# Patient Record
Sex: MALE | Race: WHITE | NOT HISPANIC OR LATINO | Employment: OTHER | URBAN - METROPOLITAN AREA
[De-identification: names, ages, dates, MRNs, and addresses within clinical notes are randomized per-mention and may not be internally consistent; named-entity substitution may affect disease eponyms.]

---

## 2017-11-29 ENCOUNTER — OFFICE VISIT (OUTPATIENT)
Dept: INTERNAL MEDICINE | Facility: CLINIC | Age: 62
End: 2017-11-29
Payer: MEDICARE

## 2017-11-29 ENCOUNTER — LAB VISIT (OUTPATIENT)
Dept: LAB | Facility: HOSPITAL | Age: 62
End: 2017-11-29
Attending: FAMILY MEDICINE
Payer: MEDICARE

## 2017-11-29 VITALS
HEIGHT: 65 IN | DIASTOLIC BLOOD PRESSURE: 72 MMHG | OXYGEN SATURATION: 98 % | RESPIRATION RATE: 16 BRPM | WEIGHT: 132.94 LBS | TEMPERATURE: 98 F | SYSTOLIC BLOOD PRESSURE: 124 MMHG | BODY MASS INDEX: 22.15 KG/M2 | HEART RATE: 121 BPM

## 2017-11-29 DIAGNOSIS — Z86.73 HISTORY OF CVA (CEREBROVASCULAR ACCIDENT): ICD-10-CM

## 2017-11-29 DIAGNOSIS — Z28.9 DELAYED IMMUNIZATIONS: ICD-10-CM

## 2017-11-29 DIAGNOSIS — I10 HTN (HYPERTENSION), BENIGN: ICD-10-CM

## 2017-11-29 DIAGNOSIS — Z11.59 ENCOUNTER FOR HEPATITIS C SCREENING TEST FOR LOW RISK PATIENT: ICD-10-CM

## 2017-11-29 DIAGNOSIS — Z72.0 TOBACCO USE: Chronic | ICD-10-CM

## 2017-11-29 DIAGNOSIS — Z12.11 SCREEN FOR COLON CANCER: ICD-10-CM

## 2017-11-29 DIAGNOSIS — R00.0 TACHYCARDIA: ICD-10-CM

## 2017-11-29 DIAGNOSIS — J44.9 CHRONIC OBSTRUCTIVE PULMONARY DISEASE, UNSPECIFIED COPD TYPE: Chronic | ICD-10-CM

## 2017-11-29 DIAGNOSIS — I10 HTN (HYPERTENSION), BENIGN: Primary | ICD-10-CM

## 2017-11-29 LAB
ALBUMIN SERPL BCP-MCNC: 3.9 G/DL
ALP SERPL-CCNC: 136 U/L
ALT SERPL W/O P-5'-P-CCNC: 24 U/L
ANION GAP SERPL CALC-SCNC: 11 MMOL/L
AST SERPL-CCNC: 16 U/L
BASOPHILS # BLD AUTO: 0.04 K/UL
BASOPHILS NFR BLD: 0.4 %
BILIRUB SERPL-MCNC: 0.6 MG/DL
BUN SERPL-MCNC: 14 MG/DL
CALCIUM SERPL-MCNC: 10 MG/DL
CHLORIDE SERPL-SCNC: 107 MMOL/L
CO2 SERPL-SCNC: 25 MMOL/L
CREAT SERPL-MCNC: 1.1 MG/DL
DIFFERENTIAL METHOD: NORMAL
EOSINOPHIL # BLD AUTO: 0.3 K/UL
EOSINOPHIL NFR BLD: 3.1 %
ERYTHROCYTE [DISTWIDTH] IN BLOOD BY AUTOMATED COUNT: 14 %
EST. GFR  (AFRICAN AMERICAN): >60 ML/MIN/1.73 M^2
EST. GFR  (NON AFRICAN AMERICAN): >60 ML/MIN/1.73 M^2
GLUCOSE SERPL-MCNC: 81 MG/DL
HCT VFR BLD AUTO: 45.7 %
HGB BLD-MCNC: 15.1 G/DL
LYMPHOCYTES # BLD AUTO: 1.8 K/UL
LYMPHOCYTES NFR BLD: 19.3 %
MCH RBC QN AUTO: 29 PG
MCHC RBC AUTO-ENTMCNC: 33 G/DL
MCV RBC AUTO: 88 FL
MONOCYTES # BLD AUTO: 0.6 K/UL
MONOCYTES NFR BLD: 6.9 %
NEUTROPHILS # BLD AUTO: 6.4 K/UL
NEUTROPHILS NFR BLD: 70.2 %
PLATELET # BLD AUTO: 234 K/UL
PMV BLD AUTO: 10.1 FL
POTASSIUM SERPL-SCNC: 4.5 MMOL/L
PROT SERPL-MCNC: 8.1 G/DL
RBC # BLD AUTO: 5.21 M/UL
SODIUM SERPL-SCNC: 143 MMOL/L
WBC # BLD AUTO: 9.15 K/UL

## 2017-11-29 PROCEDURE — G0008 ADMIN INFLUENZA VIRUS VAC: HCPCS | Mod: PBBFAC,PO

## 2017-11-29 PROCEDURE — 99205 OFFICE O/P NEW HI 60 MIN: CPT | Mod: S$PBB,,, | Performed by: FAMILY MEDICINE

## 2017-11-29 PROCEDURE — 85025 COMPLETE CBC W/AUTO DIFF WBC: CPT | Mod: PO

## 2017-11-29 PROCEDURE — 93005 ELECTROCARDIOGRAM TRACING: CPT | Mod: PBBFAC,PO | Performed by: FAMILY MEDICINE

## 2017-11-29 PROCEDURE — 36415 COLL VENOUS BLD VENIPUNCTURE: CPT | Mod: PO

## 2017-11-29 PROCEDURE — 80053 COMPREHEN METABOLIC PANEL: CPT | Mod: PO

## 2017-11-29 PROCEDURE — 80061 LIPID PANEL: CPT

## 2017-11-29 PROCEDURE — 86803 HEPATITIS C AB TEST: CPT

## 2017-11-29 PROCEDURE — 99215 OFFICE O/P EST HI 40 MIN: CPT | Mod: PBBFAC,PO,25 | Performed by: FAMILY MEDICINE

## 2017-11-29 PROCEDURE — 99999 PR PBB SHADOW E&M-EST. PATIENT-LVL V: CPT | Mod: PBBFAC,,, | Performed by: FAMILY MEDICINE

## 2017-11-29 PROCEDURE — 93010 ELECTROCARDIOGRAM REPORT: CPT | Mod: ,,, | Performed by: INTERNAL MEDICINE

## 2017-11-29 RX ORDER — ASPIRIN 325 MG
325 TABLET ORAL DAILY
COMMUNITY
Start: 2017-07-28 | End: 2018-07-28

## 2017-11-29 NOTE — PROGRESS NOTES
Subjective:       Patient ID: Td Watkins Jr. is a 62 y.o. male.    Chief Complaint: Establish Care    Patient is here to establish care  CVA 2014, 2015,2016, 2017 - stable, permanent dysarthria, some difficulty with gait he states.   HTN - stable, on no meds at this time.  COPD from PMH - stable, on no medication at this time.  Tobacco abuse - for 0.75 ppd for more than 30 years.  No recent mitigating or exacerbating factors for illnesses.        Review of Systems   Constitutional: Negative for activity change.   HENT: Negative for ear pain.    Eyes: Negative for pain.   Respiratory: Negative for shortness of breath.    Cardiovascular: Negative for chest pain.   Gastrointestinal: Negative for abdominal pain.   Genitourinary: Negative for dysuria.   Musculoskeletal: Negative for neck pain.   Skin: Negative for rash.   Neurological: Negative for headaches.       Objective:      Physical Exam   Constitutional: He appears well-developed and well-nourished. No distress.   HENT:   Head: Normocephalic and atraumatic.   Speech is barely audible   Cardiovascular: Normal rate and regular rhythm.    Pulmonary/Chest: Effort normal and breath sounds normal. No respiratory distress. He has no wheezes.   Abdominal: Soft. Bowel sounds are normal. There is no tenderness. No hernia.   Musculoskeletal: He exhibits no edema.   Neurological: He is alert.   Skin: Skin is warm and dry. No rash noted. He is not diaphoretic. No erythema.   Nursing note and vitals reviewed.      Assessment:       1. HTN (hypertension), benign    2. Chronic obstructive pulmonary disease, unspecified COPD type    3. History of CVA (cerebrovascular accident)    4. Encounter for hepatitis C screening test for low risk patient    5. Delayed immunizations    6. Screen for colon cancer    7. Tobacco use    8. Tachycardia        Plan:           Tachycardia  I have visualized EKG myself  EKG with Normal Sinus Rhythm, normal rate no ST elevation noted.      History  of CVA (cerebrovascular accident)  Given patient's family history of his father's from a stroke as well as his own personal history of having multiple  Cerebrovascular accidents over the past few years, I am concerned for a clotting disorder.  Feel that patient should come in for clotting disorder in  week    HTN (hypertension), benign  -     CBC auto differential; Future; Expected date: 11/29/2017  -     Comprehensive metabolic panel; Future; Expected date: 11/29/2017  -     Lipid panel; Future; Expected date: 11/29/2017    Chronic obstructive pulmonary disease, unspecified COPD type  -     Ambulatory Referral to Pulmonology    History of CVA (cerebrovascular accident)  -     Ambulatory referral to Cardiology  -     Lipid panel; Future; Expected date: 11/29/2017    Encounter for hepatitis C screening test for low risk patient  -     Hepatitis C antibody; Future; Expected date: 11/29/2017    Delayed immunizations  -     Influenza - Quadrivalent (3 years & older) (PF)  -     Pneumococcal Polysaccharide Vaccine (23 Valent) (SQ/IM)    Screen for colon cancer  -     Fecal Immunochemical Test (iFOBT); Future; Expected date: 11/29/2017    Tobacco use  -     US Abdominal Aorta; Future; Expected date: 11/29/2017  -     CT Chest Lung Screening Low Dose; Future; Expected date: 11/29/2017  -     Lipid panel; Future; Expected date: 11/29/2017    Tachycardia  -     IN OFFICE EKG 12-LEAD (to [place])    Other orders  -     zoster vaccine live, PF, (ZOSTAVAX, PF,) 19,400 unit/0.65 mL injection; Inject 19,400 Units into the skin once.  Dispense: 1 vial; Refill: 0  -     diphth,pertus,acell,,tetanus (BOOSTRIX) 2.5-8-5 Lf-mcg-Lf/0.5mL Syrg injection; Inject 0.5 mLs into the muscle once.  Dispense: 0.5 mL; Refill: 0

## 2017-11-29 NOTE — ASSESSMENT & PLAN NOTE
Given patient's family history of his father's from a stroke as well as his own personal history of having multiple  Cerebrovascular accidents over the past few years, I am concerned for a clotting disorder.  Feel that patient should come in for clotting disorder in  week

## 2017-11-30 LAB
CHOLEST SERPL-MCNC: 143 MG/DL
CHOLEST/HDLC SERPL: 3.2 {RATIO}
HCV AB SERPL QL IA: NEGATIVE
HDLC SERPL-MCNC: 45 MG/DL
HDLC SERPL: 31.5 %
LDLC SERPL CALC-MCNC: 85.2 MG/DL
NONHDLC SERPL-MCNC: 98 MG/DL
TRIGL SERPL-MCNC: 64 MG/DL

## 2017-11-30 NOTE — PROGRESS NOTES
Results have been reviewed . All labs are within normal range.   If you have any questions please feel free to contact me.

## 2017-12-01 ENCOUNTER — TELEPHONE (OUTPATIENT)
Dept: RADIOLOGY | Facility: HOSPITAL | Age: 62
End: 2017-12-01

## 2017-12-04 ENCOUNTER — HOSPITAL ENCOUNTER (OUTPATIENT)
Dept: RADIOLOGY | Facility: HOSPITAL | Age: 62
Discharge: HOME | End: 2017-12-04
Attending: FAMILY MEDICINE
Payer: MEDICARE

## 2017-12-04 ENCOUNTER — PATIENT OUTREACH (OUTPATIENT)
Dept: ADMINISTRATIVE | Facility: HOSPITAL | Age: 62
End: 2017-12-04

## 2017-12-04 ENCOUNTER — HOSPITAL ENCOUNTER (OUTPATIENT)
Dept: RADIOLOGY | Facility: HOSPITAL | Age: 62
Discharge: HOME | End: 2017-12-04
Attending: FAMILY MEDICINE

## 2017-12-04 DIAGNOSIS — Z72.0 TOBACCO USE: Chronic | ICD-10-CM

## 2017-12-04 PROCEDURE — 76775 US EXAM ABDO BACK WALL LIM: CPT | Mod: 26,,, | Performed by: RADIOLOGY

## 2017-12-04 PROCEDURE — 76775 US EXAM ABDO BACK WALL LIM: CPT | Mod: TC,PO

## 2017-12-04 PROCEDURE — 76497 UNLISTED CT PROCEDURE: CPT | Mod: TC,PO

## 2017-12-06 ENCOUNTER — PATIENT OUTREACH (OUTPATIENT)
Dept: ADMINISTRATIVE | Facility: HOSPITAL | Age: 62
End: 2017-12-06

## 2017-12-06 NOTE — PROGRESS NOTES
Pt hasn't completed FitKit. Pt was informed once completed the kit can be brought into the clinic lab. Pt voiced understanding

## 2017-12-07 RX ORDER — ATORVASTATIN CALCIUM 80 MG/1
80 TABLET, FILM COATED ORAL DAILY
Qty: 90 TABLET | Refills: 3 | Status: SHIPPED | OUTPATIENT
Start: 2017-12-07

## 2017-12-07 RX ORDER — ONDANSETRON 4 MG/1
8 TABLET, ORALLY DISINTEGRATING ORAL EVERY 6 HOURS PRN
COMMUNITY
End: 2018-02-01 | Stop reason: ALTCHOICE

## 2017-12-07 RX ORDER — ATORVASTATIN CALCIUM 80 MG/1
80 TABLET, FILM COATED ORAL DAILY
COMMUNITY
End: 2017-12-07 | Stop reason: SDUPTHER

## 2017-12-07 RX ORDER — LISINOPRIL 2.5 MG/1
2.5 TABLET ORAL DAILY
COMMUNITY
End: 2017-12-07 | Stop reason: SDUPTHER

## 2017-12-07 RX ORDER — MECLIZINE HYDROCHLORIDE 25 MG/1
25 TABLET ORAL 2 TIMES DAILY PRN
COMMUNITY
End: 2017-12-07

## 2017-12-07 RX ORDER — CITALOPRAM 10 MG/1
10 TABLET ORAL DAILY
Qty: 90 TABLET | Refills: 3 | Status: SHIPPED | OUTPATIENT
Start: 2017-12-07

## 2017-12-07 RX ORDER — THIAMINE HCL 100 MG
100 TABLET ORAL DAILY
COMMUNITY
End: 2017-12-07 | Stop reason: CLARIF

## 2017-12-07 RX ORDER — CITALOPRAM 10 MG/1
10 TABLET ORAL DAILY
COMMUNITY
End: 2017-12-07 | Stop reason: SDUPTHER

## 2017-12-07 RX ORDER — ACETAMINOPHEN 500 MG/1
CAPSULE, LIQUID FILLED ORAL
COMMUNITY

## 2017-12-07 RX ORDER — LISINOPRIL 2.5 MG/1
2.5 TABLET ORAL DAILY
Qty: 90 TABLET | Refills: 3 | Status: SHIPPED | OUTPATIENT
Start: 2017-12-07

## 2017-12-07 NOTE — TELEPHONE ENCOUNTER
Spoke with Pharmacy to see if they could bring the pts meds to him. She said yes that was fine and verified the pts address and number and home health nurse's number for Merary Greene.

## 2017-12-11 DIAGNOSIS — J43.9 PULMONARY EMPHYSEMA, UNSPECIFIED EMPHYSEMA TYPE: Primary | ICD-10-CM

## 2017-12-12 ENCOUNTER — TELEPHONE (OUTPATIENT)
Dept: INTERNAL MEDICINE | Facility: CLINIC | Age: 62
End: 2017-12-12

## 2017-12-12 NOTE — TELEPHONE ENCOUNTER
If diarrhea more than 5x per day or longer than 2 days should be seen in clinic     In light of the pt having tachycardia I would not recommend RX antidiarrheal bc it can worsen this.     Can use imodium OTC if this is an acute problem. Take a directed on package, use as little as possible to reduce risk of constipation. Ensure proper hydration

## 2017-12-12 NOTE — TELEPHONE ENCOUNTER
Reviewed note from home health nurse. Since has been 3 days should come in for appt with myself or Dr Lovell

## 2017-12-13 ENCOUNTER — OFFICE VISIT (OUTPATIENT)
Dept: INTERNAL MEDICINE | Facility: CLINIC | Age: 62
End: 2017-12-13
Payer: MEDICARE

## 2017-12-13 ENCOUNTER — LAB VISIT (OUTPATIENT)
Dept: LAB | Facility: HOSPITAL | Age: 62
End: 2017-12-13
Attending: FAMILY MEDICINE
Payer: MEDICARE

## 2017-12-13 VITALS
DIASTOLIC BLOOD PRESSURE: 68 MMHG | SYSTOLIC BLOOD PRESSURE: 110 MMHG | BODY MASS INDEX: 21.18 KG/M2 | WEIGHT: 131.81 LBS | HEART RATE: 90 BPM | OXYGEN SATURATION: 98 % | HEIGHT: 66 IN | RESPIRATION RATE: 20 BRPM | TEMPERATURE: 97 F

## 2017-12-13 DIAGNOSIS — Z82.3 FAMILY HISTORY OF CVA: ICD-10-CM

## 2017-12-13 DIAGNOSIS — K59.1 FUNCTIONAL DIARRHEA: ICD-10-CM

## 2017-12-13 DIAGNOSIS — Z86.73 HISTORY OF CVA (CEREBROVASCULAR ACCIDENT): Primary | ICD-10-CM

## 2017-12-13 DIAGNOSIS — Z86.73 HISTORY OF CVA (CEREBROVASCULAR ACCIDENT): ICD-10-CM

## 2017-12-13 LAB
ALBUMIN SERPL BCP-MCNC: 3.8 G/DL
ALP SERPL-CCNC: 137 U/L
ALT SERPL W/O P-5'-P-CCNC: 22 U/L
ANION GAP SERPL CALC-SCNC: 11 MMOL/L
AST SERPL-CCNC: 17 U/L
BILIRUB SERPL-MCNC: 0.8 MG/DL
BUN SERPL-MCNC: 12 MG/DL
CALCIUM SERPL-MCNC: 9.7 MG/DL
CHLORIDE SERPL-SCNC: 106 MMOL/L
CO2 SERPL-SCNC: 23 MMOL/L
CREAT SERPL-MCNC: 0.9 MG/DL
EST. GFR  (AFRICAN AMERICAN): >60 ML/MIN/1.73 M^2
EST. GFR  (NON AFRICAN AMERICAN): >60 ML/MIN/1.73 M^2
GLUCOSE SERPL-MCNC: 115 MG/DL
POTASSIUM SERPL-SCNC: 4 MMOL/L
PROT SERPL-MCNC: 7.9 G/DL
SODIUM SERPL-SCNC: 140 MMOL/L

## 2017-12-13 PROCEDURE — 83090 ASSAY OF HOMOCYSTEINE: CPT

## 2017-12-13 PROCEDURE — 86147 CARDIOLIPIN ANTIBODY EA IG: CPT | Mod: 59

## 2017-12-13 PROCEDURE — 86147 CARDIOLIPIN ANTIBODY EA IG: CPT

## 2017-12-13 PROCEDURE — 85305 CLOT INHIBIT PROT S TOTAL: CPT

## 2017-12-13 PROCEDURE — 80053 COMPREHEN METABOLIC PANEL: CPT | Mod: PO

## 2017-12-13 PROCEDURE — 99999 PR PBB SHADOW E&M-EST. PATIENT-LVL IV: CPT | Mod: PBBFAC,,, | Performed by: FAMILY MEDICINE

## 2017-12-13 PROCEDURE — 99214 OFFICE O/P EST MOD 30 MIN: CPT | Mod: PBBFAC,PO | Performed by: FAMILY MEDICINE

## 2017-12-13 PROCEDURE — 81241 F5 GENE: CPT

## 2017-12-13 PROCEDURE — 99214 OFFICE O/P EST MOD 30 MIN: CPT | Mod: S$PBB,,, | Performed by: FAMILY MEDICINE

## 2017-12-13 PROCEDURE — 85303 CLOT INHIBIT PROT C ACTIVITY: CPT

## 2017-12-13 PROCEDURE — 81240 F2 GENE: CPT

## 2017-12-13 PROCEDURE — 36415 COLL VENOUS BLD VENIPUNCTURE: CPT | Mod: PO

## 2017-12-13 PROCEDURE — 86146 BETA-2 GLYCOPROTEIN ANTIBODY: CPT | Mod: 59

## 2017-12-13 NOTE — PROGRESS NOTES
Subjective:       Patient ID: Td Watkins Jr. is a 62 y.o. male.    Chief Complaint: Follow-up and Diarrhea    Diarrhea    This is a recurrent problem. Episode onset: 6 months ago. Episode frequency: occurs 5-6 x/day, patrice 2 days peer week. The problem has been gradually improving. The stool consistency is described as watery. The patient states that diarrhea awakens him from sleep. Pertinent negatives include no abdominal pain, bloating, chills, coughing, fever, headaches or vomiting. Nothing aggravates the symptoms.     Review of Systems   Constitutional: Negative for chills and fever.   Respiratory: Negative for cough and shortness of breath.    Cardiovascular: Negative for chest pain.   Gastrointestinal: Positive for diarrhea. Negative for abdominal pain, bloating and vomiting.   Neurological: Negative for headaches.       Objective:      Physical Exam   Constitutional: He appears well-developed and well-nourished. No distress.   HENT:   Head: Normocephalic and atraumatic.   Right Ear: External ear normal.   Left Ear: External ear normal.   Nose: Nose normal.   Pulmonary/Chest: Effort normal and breath sounds normal. No respiratory distress. He has no wheezes.   Abdominal: Soft. He exhibits no distension. There is no tenderness.   Skin: Skin is warm and dry. No rash noted. He is not diaphoretic. No erythema.   Nursing note and vitals reviewed.      Assessment:       1. History of CVA (cerebrovascular accident)    2. Family history of CVA    3. Functional diarrhea        Plan:           History of CVA (cerebrovascular accident)  Pt has history of strokes annually, with a FH of stroke, and death from stroke.  Hypercoag w/u today.      Family history of CVA  Pt has history of strokes annually, with a FH of stroke, and death from stroke.  Hypercoag w/u today.    Functional diarrhea  Hx of diarrhea, twice weekly over the past 6 months. Start fiber.   Pt has CMP today WNL. No s/s of hypokalemia or  dehydration    History of CVA (cerebrovascular accident)  -     Cardiolipin antibody; Future; Expected date: 12/13/2017  -     Cardiolipin antibody, IgA; Future; Expected date: 12/13/2017  -     Cancel: Antithrombin III; Future; Expected date: 12/13/2017  -     Protein C activity; Future; Expected date: 12/13/2017  -     Protein S activity; Future; Expected date: 12/13/2017  -     DRVVT; Future; Expected date: 12/13/2017  -     Homocysteine, serum; Future; Expected date: 12/13/2017  -     Factor 5 leiden; Future; Expected date: 12/13/2017  -     Prothrombin gene mutation; Future; Expected date: 12/13/2017  -     Beta-2 glycoprotein antibodies; Future; Expected date: 12/13/2017  -     Miscellaneous Sendout Test Blood; Future; Expected date: 12/13/2017  -     Miscellaneous Sendout Test Blood; Future; Expected date: 12/13/2017  -     Miscellaneous Sendout Test Blood; Future; Expected date: 12/13/2017  -     Miscellaneous Sendout Test Blood; Future; Expected date: 12/13/2017  -     ANTITHROMBIN III; Future; Expected date: 12/13/2017    Family history of CVA  -     Cardiolipin antibody; Future; Expected date: 12/13/2017  -     Cardiolipin antibody, IgA; Future; Expected date: 12/13/2017  -     Cancel: Antithrombin III; Future; Expected date: 12/13/2017  -     Protein C activity; Future; Expected date: 12/13/2017  -     Protein S activity; Future; Expected date: 12/13/2017  -     DRVVT; Future; Expected date: 12/13/2017  -     Homocysteine, serum; Future; Expected date: 12/13/2017  -     Factor 5 leiden; Future; Expected date: 12/13/2017  -     Prothrombin gene mutation; Future; Expected date: 12/13/2017  -     Beta-2 glycoprotein antibodies; Future; Expected date: 12/13/2017  -     Miscellaneous Sendout Test Blood; Future; Expected date: 12/13/2017  -     Miscellaneous Sendout Test Blood; Future; Expected date: 12/13/2017  -     Miscellaneous Sendout Test Blood; Future; Expected date: 12/13/2017  -     Miscellaneous  Sendout Test Blood; Future; Expected date: 12/13/2017  -     ANTITHROMBIN III; Future; Expected date: 12/13/2017    Functional diarrhea  -     inulin-sorbitol (FIBER SUPPLEMENT, INULIN,) 2 gram Chew; Take 2 each by mouth once daily.  Dispense: 60 each; Refill: 11  -     Comprehensive metabolic panel; Future; Expected date: 12/13/2017

## 2017-12-13 NOTE — PROGRESS NOTES
Results have been reviewed . All labs are within normal range.   CMP looks fine, please ask him to stay well hydrated  If you have any questions please feel free to contact me.

## 2017-12-14 LAB
APTT PROTEIN C ACTIVATOR+FV DP/APTT PPP: 77 %
HCYS SERPL-SCNC: 13.4 UMOL/L
PROT S ACT/NOR PPP: >130 %

## 2017-12-14 NOTE — ASSESSMENT & PLAN NOTE
Hx of diarrhea, twice weekly over the past 6 months. Start fiber.   Pt has CMP today WNL. No s/s of hypokalemia or dehydration

## 2017-12-14 NOTE — ASSESSMENT & PLAN NOTE
Pt has history of strokes annually, with a FH of stroke, and death from stroke.  Hypercoag w/u today.

## 2017-12-15 LAB
CARDIOLIPIN IGG SER IA-ACNC: <9.4 GPL
CARDIOLIPIN IGM SER IA-ACNC: <9.4 MPL
DEPRECATED CARDIOLIPIN IGA SER: <9 APL

## 2017-12-16 LAB
B2 GLYCOPROT1 IGA SER QL: <9 SAU
B2 GLYCOPROT1 IGG SER QL: <9 SGU
B2 GLYCOPROT1 IGM SER QL: <9 SMU
F2 GENE MUT ANL BLD/T: NORMAL
F5 P.R506Q BLD/T QL: NORMAL

## 2017-12-26 NOTE — PROGRESS NOTES
Results have been reviewed . All labs are within normal range.   If you have any questions please feel free to contact me.  Pending other tests

## 2018-01-16 ENCOUNTER — TELEPHONE (OUTPATIENT)
Dept: INTERNAL MEDICINE | Facility: CLINIC | Age: 63
End: 2018-01-16

## 2018-01-16 NOTE — TELEPHONE ENCOUNTER
[facility] is requesting a verbal order for permission to recertify patient for SN and PT. Is this ok?

## 2018-01-16 NOTE — TELEPHONE ENCOUNTER
Pts Home Health nurse Merary called and said that pt is still having diarrhea about 1 day every week. When it occurs he usually has 5 to 6 loose BM's that day. Does pt need to be sooner than the 3 mo fu appt scheduled for him on 3/13/18 to discuss test results and and come up with a plan to resolve the weekly diarrhea. Or is there something that can be called in for PRN for when the diarrhea occurs?

## 2018-01-16 NOTE — TELEPHONE ENCOUNTER
Spoke with Merary AYALA, with [facility] and informed her per  that was a yes for verbal orders. Merary verbalized understanding.

## 2018-01-18 ENCOUNTER — PATIENT OUTREACH (OUTPATIENT)
Dept: ADMINISTRATIVE | Facility: HOSPITAL | Age: 63
End: 2018-01-18

## 2018-01-23 ENCOUNTER — TELEPHONE (OUTPATIENT)
Dept: INTERNAL MEDICINE | Facility: CLINIC | Age: 63
End: 2018-01-23

## 2018-01-23 NOTE — TELEPHONE ENCOUNTER
Merary with [facility] home [facility] called and said the pt missed his appt last week dur to transportation issues. That she rescheduled it for tomorrow, but he still doesn't have transportation. She is trying to get him set up with [place] [facility] for transportation but it wont be processed this week. That she will call us back to reschedule it later.

## 2018-02-01 ENCOUNTER — OFFICE VISIT (OUTPATIENT)
Dept: INTERNAL MEDICINE | Facility: CLINIC | Age: 63
End: 2018-02-01
Payer: MEDICARE

## 2018-02-01 VITALS
BODY MASS INDEX: 21.08 KG/M2 | TEMPERATURE: 97 F | HEART RATE: 103 BPM | SYSTOLIC BLOOD PRESSURE: 108 MMHG | DIASTOLIC BLOOD PRESSURE: 58 MMHG | WEIGHT: 131.19 LBS | RESPIRATION RATE: 12 BRPM | HEIGHT: 66 IN | OXYGEN SATURATION: 99 %

## 2018-02-01 DIAGNOSIS — K59.1 FUNCTIONAL DIARRHEA: ICD-10-CM

## 2018-02-01 DIAGNOSIS — E86.0 DEHYDRATION: ICD-10-CM

## 2018-02-01 PROCEDURE — 99999 PR PBB SHADOW E&M-EST. PATIENT-LVL IV: CPT | Mod: PBBFAC,,, | Performed by: NURSE PRACTITIONER

## 2018-02-01 PROCEDURE — 99214 OFFICE O/P EST MOD 30 MIN: CPT | Mod: PBBFAC,PO | Performed by: NURSE PRACTITIONER

## 2018-02-01 PROCEDURE — 99214 OFFICE O/P EST MOD 30 MIN: CPT | Mod: S$PBB,,, | Performed by: NURSE PRACTITIONER

## 2018-02-01 NOTE — PROGRESS NOTES
Subjective:       Patient ID: Td Watkins Jr. is a 62 y.o. male.    Chief Complaint: Diarrhea    Pt admits to some lightheadedness. He states that he does only drink about one can of coke a day and no other liquids. He also reports that he does not eat much through the day, specifically he has a low intake of protein and only has one serving at night.      Diarrhea    This is a chronic problem. The current episode started more than 1 month ago. The problem occurs 5 to 10 times per day. The problem has been resolved. Pertinent negatives include no abdominal pain, arthralgias, bloating, chills, coughing, fever, headaches, increased  flatus, myalgias, sweats, URI, vomiting or weight loss. Treatments tried: fiber supplement. The treatment provided significant relief.     Review of Systems   Constitutional: Negative for appetite change, chills, diaphoresis, fatigue, fever and weight loss.   HENT: Negative.    Eyes: Negative.    Respiratory: Negative for cough, chest tightness and wheezing.    Cardiovascular: Negative.    Gastrointestinal: Positive for diarrhea. Negative for abdominal pain, bloating, flatus and vomiting.   Musculoskeletal: Negative for arthralgias and myalgias.   Skin: Negative.    Neurological: Positive for weakness (chronic, no changes) and light-headedness. Negative for dizziness and headaches.       Objective:      Physical Exam   Constitutional: He is oriented to person, place, and time. He appears well-developed and well-nourished. No distress.   HENT:   Head: Normocephalic and atraumatic.   Right Ear: External ear normal.   Left Ear: External ear normal.   Nose: Nose normal.   Eyes: Conjunctivae and EOM are normal. Pupils are equal, round, and reactive to light.   Cardiovascular: Normal rate, regular rhythm, normal heart sounds and intact distal pulses.    Pulmonary/Chest: Effort normal and breath sounds normal. No respiratory distress. He has no wheezes.   Abdominal: Soft. He exhibits no  distension. There is no tenderness.   Neurological: He is alert and oriented to person, place, and time. He exhibits normal muscle tone. Coordination normal. GCS eye subscore is 4. GCS verbal subscore is 5. GCS motor subscore is 6.   Skin: Skin is warm and dry. No rash noted. He is not diaphoretic. No erythema.   Psychiatric: He has a normal mood and affect. His behavior is normal. Judgment and thought content normal. His speech is delayed.   Nursing note and vitals reviewed.      Assessment:       1. Functional diarrhea    2. Dehydration        Plan:     Problem List Items Addressed This Visit        Renal/    Dehydration    Current Assessment & Plan     Increase water to at least 64 ounces daily            GI    Functional diarrhea    Current Assessment & Plan     Greatly improved with use of fiber, continue this            keep F/U with Dr Lovell as scheduled      After pt was gone was given note from  PT stating pt was having coordination issues. Did not have any complaints of this during his visit and he seemed coordinated and well during his visit.

## 2018-02-01 NOTE — PATIENT INSTRUCTIONS
Dehydration (Adult)  Dehydration occurs when your body loses too much fluid. This may be the result of prolonged vomiting or diarrhea, excessive sweating, or a high fever. It may also happen if you dont drink enough fluid when youre sick or out in the heat. Misuse of diuretics (water pills) can also be a cause.  Symptoms include thirst and decreased urine output. You may also feel dizzy, weak, fatigued, or very drowsy. The diet described below is usually enough to treat dehydration. In some cases, you may need medicine.  Home care  · Drink at least 12 8-ounce glasses of fluid every day to resolve the dehydration. Fluid may include water; orange juice; lemonade; apple, grape, or cranberry juice; clear fruit drinks; electrolyte replacement and sports drinks; and teas and coffee without caffeine. If you have been diagnosed with a kidney disease, ask your doctor how much and what types of fluids you should drink to prevent dehydration. If you have kidney disease, fluid can build up in the body. This can be dangerous to your health.  · If you have a fever, muscle aches, or a headache as a result of a cold or flu, you may take acetaminophen or ibuprofen, unless another medicine was prescribed. If you have chronic liver or kidney disease, or have ever had a stomach ulcer or gastrointestinal bleeding, talk with your health care provider before using these medicines. Don't take aspirin if you are younger than 18 and have a fever. Aspirin raises the chance for severe liver injury.  Follow-up care  Follow up with your health care provider, or as advised.  When to seek medical advice  Call your health care provider right away if any of these occur:  · Continued vomiting  · Frequent diarrhea (more than 5 times a day); blood (red or black color) or mucus in diarrhea  · Blood in vomit or stool  · Swollen abdomen or increasing abdominal pain  · Weakness, dizziness, or fainting  · Unusual drowsiness or confusion  · Reduced urine  output or extreme thirst  · Fever of 100.4°F (34°C) or higher  Date Last Reviewed: 5/31/2015  © 5496-8903 [employer]. [address] [place] [address], [place] [address]. All rights reserved. This information is not intended as a substitute for professional medical care. Always follow your healthcare professional's instructions.        Nutrition and MyPlate: Protein Foods    This group includes foods that are high in protein. Protein helps the body build new cells and keeps tissues healthy. Most Americans get enough protein without even trying. It can be harder for vegetarians, but plenty of non-meat foods are rich in protein, too. Its best to get protein from a variety of sources.  Nutrient-rich choices  There's a lot more to this food group than just meat and beans. It also includes nuts, seeds, and eggs. There are all sorts of nutrient-rich choices:  · Chicken and turkey with the skin removed  · Fish and shellfish  · Lean beef, pork, or lamb (without visible fat)  · Soy products, such as tofu, soybeans (edamame), tempeh, or soymilk  · Black beans, kidney beans, fitzgerald beans, chickpeas (garbanzo beans), and lentils (Note: beans and peas count as both a protein and a vegetable)  · Peanuts, almonds, walnuts, sesame seeds, and sunflower seeds, as well as foods made from these (such as peanut butter or tahini)  · Eggs and foods made with eggs (such as quiche or frittata)  What makes meat and beans less healthy?  · Fatty meat is not healthy. Before you cook meat, trim off all the fat you can see. Chicken and turkey skin is also high in fat, and should be removed before cooking.  · Breading and frying make food less healthy. This includes dishes like fried chicken, fried fish, and refried beans.  · Sausage and lunch meats tend to be high in fat and salt. Buy low-fat, low-sodium versions.  One small change  Make a meal that includes a non-meat source of protein (such as tofu, lentils, or any other food listed  above). Have a better idea? Write it here:  _____________________________________________________________  Date Last Reviewed: 6/25/2015  © 7018-0099 [employer]. [address] [place] [address], [place] [address]. All rights reserved. This information is not intended as a substitute for professional medical care. Always follow your healthcare professional's instructions.      INCREASE WATER AND PROTEIN INTAKE

## 2018-02-09 ENCOUNTER — HOSPITAL ENCOUNTER (EMERGENCY)
Facility: HOSPITAL | Age: 63
Discharge: HOME | End: 2018-02-09
Attending: EMERGENCY MEDICINE
Payer: MEDICARE

## 2018-02-09 VITALS
SYSTOLIC BLOOD PRESSURE: 125 MMHG | TEMPERATURE: 98 F | OXYGEN SATURATION: 96 % | HEART RATE: 66 BPM | BODY MASS INDEX: 21.08 KG/M2 | RESPIRATION RATE: 16 BRPM | HEIGHT: 66 IN | DIASTOLIC BLOOD PRESSURE: 61 MMHG | WEIGHT: 131.19 LBS

## 2018-02-09 DIAGNOSIS — N20.0 KIDNEY STONE ON LEFT SIDE: Primary | ICD-10-CM

## 2018-02-09 DIAGNOSIS — R10.32 LEFT LOWER QUADRANT PAIN: ICD-10-CM

## 2018-02-09 LAB
ALBUMIN SERPL BCP-MCNC: 3.9 G/DL
ALP SERPL-CCNC: 110 U/L
ALT SERPL W/O P-5'-P-CCNC: 18 U/L
ANION GAP SERPL CALC-SCNC: 11 MMOL/L
AST SERPL-CCNC: 15 U/L
BACTERIA #/AREA URNS AUTO: ABNORMAL /HPF
BASOPHILS # BLD AUTO: 0.02 K/UL
BASOPHILS NFR BLD: 0.2 %
BILIRUB SERPL-MCNC: 0.5 MG/DL
BILIRUB UR QL STRIP: NEGATIVE
BUN SERPL-MCNC: 13 MG/DL
CALCIUM SERPL-MCNC: 9.4 MG/DL
CHLORIDE SERPL-SCNC: 109 MMOL/L
CLARITY UR REFRACT.AUTO: CLEAR
CO2 SERPL-SCNC: 23 MMOL/L
COLOR UR AUTO: YELLOW
CREAT SERPL-MCNC: 1.2 MG/DL
DIFFERENTIAL METHOD: ABNORMAL
EOSINOPHIL # BLD AUTO: 0.1 K/UL
EOSINOPHIL NFR BLD: 0.6 %
ERYTHROCYTE [DISTWIDTH] IN BLOOD BY AUTOMATED COUNT: 13.7 %
EST. GFR  (AFRICAN AMERICAN): >60 ML/MIN/1.73 M^2
EST. GFR  (NON AFRICAN AMERICAN): >60 ML/MIN/1.73 M^2
GLUCOSE SERPL-MCNC: 140 MG/DL
GLUCOSE UR QL STRIP: NEGATIVE
HCT VFR BLD AUTO: 43.8 %
HGB BLD-MCNC: 14.6 G/DL
HGB UR QL STRIP: ABNORMAL
KETONES UR QL STRIP: NEGATIVE
LEUKOCYTE ESTERASE UR QL STRIP: NEGATIVE
LIPASE SERPL-CCNC: 43 U/L
LYMPHOCYTES # BLD AUTO: 1.1 K/UL
LYMPHOCYTES NFR BLD: 8.7 %
MCH RBC QN AUTO: 29.2 PG
MCHC RBC AUTO-ENTMCNC: 33.3 G/DL
MCV RBC AUTO: 88 FL
MICROSCOPIC COMMENT: ABNORMAL
MONOCYTES # BLD AUTO: 0.4 K/UL
MONOCYTES NFR BLD: 3 %
NEUTROPHILS # BLD AUTO: 10.8 K/UL
NEUTROPHILS NFR BLD: 87.3 %
NITRITE UR QL STRIP: NEGATIVE
PH UR STRIP: 6 [PH] (ref 5–8)
PLATELET # BLD AUTO: 225 K/UL
PMV BLD AUTO: 10.2 FL
POTASSIUM SERPL-SCNC: 3.9 MMOL/L
PROT SERPL-MCNC: 7 G/DL
PROT UR QL STRIP: ABNORMAL
RBC # BLD AUTO: 5 M/UL
RBC #/AREA URNS AUTO: >100 /HPF (ref 0–4)
SODIUM SERPL-SCNC: 143 MMOL/L
SP GR UR STRIP: 1.02 (ref 1–1.03)
URN SPEC COLLECT METH UR: ABNORMAL
UROBILINOGEN UR STRIP-ACNC: NEGATIVE EU/DL
WBC # BLD AUTO: 12.41 K/UL
WBC #/AREA URNS AUTO: 2 /HPF (ref 0–5)
YEAST UR QL AUTO: ABNORMAL

## 2018-02-09 PROCEDURE — 96361 HYDRATE IV INFUSION ADD-ON: CPT

## 2018-02-09 PROCEDURE — 25000003 PHARM REV CODE 250: Performed by: EMERGENCY MEDICINE

## 2018-02-09 PROCEDURE — 83690 ASSAY OF LIPASE: CPT

## 2018-02-09 PROCEDURE — 63600175 PHARM REV CODE 636 W HCPCS: Performed by: EMERGENCY MEDICINE

## 2018-02-09 PROCEDURE — 80053 COMPREHEN METABOLIC PANEL: CPT

## 2018-02-09 PROCEDURE — 96374 THER/PROPH/DIAG INJ IV PUSH: CPT

## 2018-02-09 PROCEDURE — 85025 COMPLETE CBC W/AUTO DIFF WBC: CPT

## 2018-02-09 PROCEDURE — 99285 EMERGENCY DEPT VISIT HI MDM: CPT | Mod: 25

## 2018-02-09 PROCEDURE — 81000 URINALYSIS NONAUTO W/SCOPE: CPT

## 2018-02-09 RX ORDER — HYDROCODONE BITARTRATE AND ACETAMINOPHEN 5; 325 MG/1; MG/1
1 TABLET ORAL
Status: COMPLETED | OUTPATIENT
Start: 2018-02-09 | End: 2018-02-09

## 2018-02-09 RX ORDER — KETOROLAC TROMETHAMINE 30 MG/ML
15 INJECTION, SOLUTION INTRAMUSCULAR; INTRAVENOUS
Status: COMPLETED | OUTPATIENT
Start: 2018-02-09 | End: 2018-02-09

## 2018-02-09 RX ORDER — TAMSULOSIN HYDROCHLORIDE 0.4 MG/1
0.4 CAPSULE ORAL DAILY
Qty: 30 CAPSULE | Refills: 0 | Status: SHIPPED | OUTPATIENT
Start: 2018-02-09 | End: 2019-02-09

## 2018-02-09 RX ORDER — ONDANSETRON 4 MG/1
8 TABLET, FILM COATED ORAL 2 TIMES DAILY
COMMUNITY

## 2018-02-09 RX ORDER — HYDROCODONE BITARTRATE AND ACETAMINOPHEN 7.5; 325 MG/1; MG/1
1 TABLET ORAL EVERY 6 HOURS PRN
Qty: 12 TABLET | Refills: 0 | Status: SHIPPED | OUTPATIENT
Start: 2018-02-09

## 2018-02-09 RX ORDER — KETOROLAC TROMETHAMINE 10 MG/1
10 TABLET, FILM COATED ORAL EVERY 6 HOURS
Qty: 15 TABLET | Refills: 0 | Status: SHIPPED | OUTPATIENT
Start: 2018-02-09

## 2018-02-09 RX ORDER — THIAMINE HCL 250 MG
250 TABLET ORAL DAILY
COMMUNITY

## 2018-02-09 RX ADMIN — HYDROCODONE BITARTRATE AND ACETAMINOPHEN 1 TABLET: 5; 325 TABLET ORAL at 02:02

## 2018-02-09 RX ADMIN — KETOROLAC TROMETHAMINE 15 MG: 30 INJECTION, SOLUTION INTRAMUSCULAR at 02:02

## 2018-02-09 RX ADMIN — SODIUM CHLORIDE 1000 ML: 0.9 INJECTION, SOLUTION INTRAVENOUS at 01:02

## 2018-02-09 NOTE — ED NOTES
Patient reports feeling better. Skin warm and dry resp even and unlabored. Patient awaiting disposition. Will continue to monitor.

## 2018-02-09 NOTE — ED NOTES
Calls placed to patients sister and daughter on his contact list and was unsuccessful in reaching either party. Patient reports to call [facility] but due to a stroke he does not know his number.

## 2018-02-09 NOTE — ED PROVIDER NOTES
Encounter Date: 2/9/2018       History     Chief Complaint   Patient presents with    Abdominal Pain     Patient reports LLQ pain tender to palaption     The history is provided by the patient and the EMS personnel.   Abdominal Pain   The current episode started 1 to 2 hours ago. The onset of the illness was abrupt. The problem has been gradually improving. The abdominal pain is located in the LLQ. The abdominal pain does not radiate. The severity of the abdominal pain is 7/10. The abdominal pain is relieved by nothing. The other symptoms of the illness do not include fever, jaundice, melena, shortness of breath, nausea, vomiting, diarrhea, dysuria, hematemesis or hematochezia.   The patient has not had a change in bowel habit (Normal bowel movement this morning). Symptoms associated with the illness do not include chills, anorexia, diaphoresis, heartburn, constipation, urgency, hematuria, frequency or back pain.     Review of patient's allergies indicates:  No Known Allergies  Past Medical History:   Diagnosis Date    COPD (chronic obstructive pulmonary disease)     CVA (cerebral infarction)     HLD (hyperlipidemia)     HTN (hypertension), benign     Stroke      History reviewed. No pertinent surgical history.  Family History   Problem Relation Age of Onset    Stroke Father      Social History   Substance Use Topics    Smoking status: Current Every Day Smoker     Packs/day: 1.00     Types: Cigarettes    Smokeless tobacco: Never Used    Alcohol use No     Review of Systems   Constitutional: Negative for chills, diaphoresis and fever.   HENT: Negative for sore throat.    Respiratory: Negative for shortness of breath.    Cardiovascular: Negative for chest pain.   Gastrointestinal: Positive for abdominal pain. Negative for anorexia, constipation, diarrhea, heartburn, hematemesis, hematochezia, jaundice, melena, nausea and vomiting.   Genitourinary: Negative for difficulty urinating, dysuria, flank pain,  frequency, hematuria and urgency.   Musculoskeletal: Negative for back pain.   Skin: Negative for rash.   Neurological: Negative for weakness.   Hematological: Does not bruise/bleed easily.   All other systems reviewed and are negative.      Physical Exam     Initial Vitals [02/09/18 1327]   BP Pulse Resp Temp SpO2   128/66 74 18 97.8 °F (36.6 °C) 98 %      MAP       86.67         Physical Exam    Nursing note and vitals reviewed.  Constitutional: He appears well-developed and well-nourished.   HENT:   Head: Normocephalic and atraumatic.   Mouth/Throat: Oropharynx is clear and moist.   Eyes: EOM are normal. Pupils are equal, round, and reactive to light.   Neck: Normal range of motion. Neck supple.   Cardiovascular: Normal rate, regular rhythm, normal heart sounds and intact distal pulses.   Pulmonary/Chest: Breath sounds normal. No respiratory distress. He has no wheezes. He has no rhonchi.   Abdominal: Soft. Bowel sounds are normal. He exhibits no distension, no ascites and no mass. There is tenderness in the left lower quadrant. There is guarding. There is no rigidity, no rebound, no tenderness at McBurney's point and negative Snow's sign. No hernia.   Musculoskeletal: Normal range of motion. He exhibits no edema.   Neurological: He is alert and oriented to person, place, and time. He has normal strength. No cranial nerve deficit or sensory deficit.   Skin: Skin is warm and dry. No rash noted.   Psychiatric: He has a normal mood and affect. His behavior is normal. Judgment and thought content normal.         ED Course   Procedures  Labs Reviewed   CBC W/ AUTO DIFFERENTIAL - Abnormal; Notable for the following:        Result Value    Gran # (ANC) 10.8 (*)     Gran% 87.3 (*)     Lymph% 8.7 (*)     Mono% 3.0 (*)     All other components within normal limits   COMPREHENSIVE METABOLIC PANEL - Abnormal; Notable for the following:     Glucose 140 (*)     All other components within normal limits   URINALYSIS -  Abnormal; Notable for the following:     Protein, UA Trace (*)     Occult Blood UA 3+ (*)     All other components within normal limits   URINALYSIS MICROSCOPIC - Abnormal; Notable for the following:     RBC, UA >100 (*)     Bacteria, UA Few (*)     Yeast, UA Rare (*)     All other components within normal limits   LIPASE        Results for orders placed or performed during the hospital encounter of 02/09/18   CBC W/ AUTO DIFFERENTIAL   Result Value Ref Range    WBC 12.41 3.90 - 12.70 K/uL    RBC 5.00 4.60 - 6.20 M/uL    Hemoglobin 14.6 14.0 - 18.0 g/dL    Hematocrit 43.8 40.0 - 54.0 %    MCV 88 82 - 98 fL    MCH 29.2 27.0 - 31.0 pg    MCHC 33.3 32.0 - 36.0 g/dL    RDW 13.7 11.5 - 14.5 %    Platelets 225 150 - 350 K/uL    MPV 10.2 9.2 - 12.9 fL    Gran # (ANC) 10.8 (H) 1.8 - 7.7 K/uL    Lymph # 1.1 1.0 - 4.8 K/uL    Mono # 0.4 0.3 - 1.0 K/uL    Eos # 0.1 0.0 - 0.5 K/uL    Baso # 0.02 0.00 - 0.20 K/uL    Gran% 87.3 (H) 38.0 - 73.0 %    Lymph% 8.7 (L) 18.0 - 48.0 %    Mono% 3.0 (L) 4.0 - 15.0 %    Eosinophil% 0.6 0.0 - 8.0 %    Basophil% 0.2 0.0 - 1.9 %    Differential Method Automated    Comp. Metabolic Panel   Result Value Ref Range    Sodium 143 136 - 145 mmol/L    Potassium 3.9 3.5 - 5.1 mmol/L    Chloride 109 95 - 110 mmol/L    CO2 23 23 - 29 mmol/L    Glucose 140 (H) 70 - 110 mg/dL    BUN, Bld 13 8 - 23 mg/dL    Creatinine 1.2 0.5 - 1.4 mg/dL    Calcium 9.4 8.7 - 10.5 mg/dL    Total Protein 7.0 6.0 - 8.4 g/dL    Albumin 3.9 3.5 - 5.2 g/dL    Total Bilirubin 0.5 0.1 - 1.0 mg/dL    Alkaline Phosphatase 110 55 - 135 U/L    AST 15 10 - 40 U/L    ALT 18 10 - 44 U/L    Anion Gap 11 8 - 16 mmol/L    eGFR if African American >60.0 >60 mL/min/1.73 m^2    eGFR if non African American >60.0 >60 mL/min/1.73 m^2   Lipase   Result Value Ref Range    Lipase 43 4 - 60 U/L   Urinalysis - Clean Catch   Result Value Ref Range    Specimen UA Urine, Clean Catch     Color, UA Yellow Yellow, Straw, Arianne    Appearance, UA Clear Clear     pH, UA 6.0 5.0 - 8.0    Specific Gravity, UA 1.025 1.005 - 1.030    Protein, UA Trace (A) Negative    Glucose, UA Negative Negative    Ketones, UA Negative Negative    Bilirubin (UA) Negative Negative    Occult Blood UA 3+ (A) Negative    Nitrite, UA Negative Negative    Urobilinogen, UA Negative <2.0 EU/dL    Leukocytes, UA Negative Negative   Urinalysis Microscopic   Result Value Ref Range    RBC, UA >100 (H) 0 - 4 /hpf    WBC, UA 2 0 - 5 /hpf    Bacteria, UA Few (A) None-Occ /hpf    Yeast, UA Rare (A) None    Microscopic Comment SEE COMMENT                 Imaging Results          CT Renal Stone Study ABD Pelvis WO (Final result)  Result time 02/09/18 14:11:02    Final result by Alvarado Dixon MD (02/09/18 14:11:02)                 Impression:       Moderate left-sided hydronephrosis down to the UVJ. 3 mm stone at the left UVJ.     Diffuse wall thickening which suggests chronic outlet obstruction. Prostate is enlarged measuring 5.4 cm    All CT scans at this facility use dose modulation, iterative reconstruction and/or weight based dosing when appropriate to reduce radiation dose to as low as reasonably achievable.      Electronically signed by: ALVARADO DIXON MD  Date:     02/09/18  Time:    14:11              Narrative:    Indication: Abdominal pain.    Routine noncontrast CT images of the abdomen and pelvis were obtained.    Findings: Evaluation of solid organ or vascular pathology is limited due to lack of IV contrast.    The lung bases are well aerated.  Heart size is normal.  No pericardial or pleural effusion. The emphysematous changes are seen at the lung bases.    Moderate hiatal hernia is noted. Moderate amount of ingested material is seen within the stomach. Fluid level is seen within the distal esophagus likely reflecting reflux.    The liver is normal in size and attenuation on this noncontrast exam.  1.2 cm hypodensity is seen within the right hepatic lobe which is too small to characterize but  likely reflects a small cyst. Additional subcentimeter hypodensity is seen within segment 7. Layering gallstones are seen within the gallbladder which is nondistended. The  spleen, pancreas, adrenal glands are normal.  Aorta demonstrates moderate atherosclerotic disease.      The kidneys are normal in size shape and position without radiopaque calculus.Bilateral nonspecific perinephric stranding is seen. Moderate left-sided hydronephrosis down to the UVJ. 3 mm stone at the left UVJ. Diffuse wall thickening which suggests chronic outlet obstruction. Prostate is enlarged measuring 5.4 cm    The visualized loops of small bowel are unremarkable.The appendix is visualized in the right lower quadrant.  There is no inflammation. Colon demonstrates diverticulosis without evidence of diverticulitis..      Bones appear intact .                                         ED Course     patient improved after treatment here in the emergency Department tolerating by mouth well and eager for discharge home.  His abdomen is still minimally tender on the left lower quadrant but no rebound or guarding.  He will follow-up with his primary care doctor for recheck in the next 2-3 days or return to emergency department for any worsening signs or symptoms.  He will also follow-up with urologist as discussed and was given information to see Dr. Chelsy JC.    I discussed with patient and/or family/caretaker that evaluation in the ED does not suggest any emergent or life threatening medical conditions requiring immediate intervention beyond what was provided in the ED, and I believe patient is safe for discharge.  Regardless, an unremarkable evaluation in the ED does not preclude the development or presence of a serious of life threatening condition. As such, patient was instructed to return immediately for any worsening or change in current symptoms.  Clinical Impression:   The primary encounter diagnosis was Kidney stone on left side. A  diagnosis of Left lower quadrant pain was also pertinent to this visit.    Disposition:   Disposition: Discharged  Condition: Stable                        Robert Amezquita MD  02/09/18 [id number]

## 2018-03-08 ENCOUNTER — TELEPHONE (OUTPATIENT)
Dept: INTERNAL MEDICINE | Facility: CLINIC | Age: 63
End: 2018-03-08

## 2018-03-08 NOTE — TELEPHONE ENCOUNTER
Informed merary per  he Agrees with above, except Bp.   If sys < 120, re-check in 5 minutes, then if < 110 sys, may hold lisinopril. (Routing comment)     Merary verbalized understanding

## 2018-03-08 NOTE — TELEPHONE ENCOUNTER
Spoke with pts home health nurse and informed her per , That's fine.   Once they get the correct pills, he can resume. She verbalized understanding.

## 2018-03-08 NOTE — TELEPHONE ENCOUNTER
Merary the pts home health nurse with [facility] wants to know if they can recert the pt for further teaching on meds and management of self care? They have taught him how to fill his pill box but still does not know the purpose of his meds. He also needs reinforcement on arranging MD appts and transportation and on teaching how to check his own blood pressure d/t orders to hold lisinopril with systolic BP < 120.

## 2018-03-08 NOTE — TELEPHONE ENCOUNTER
Nurse said [facility] will not get the correct ones. That their supplier doesn't have the strength. Will 100mg of the B1 be sufficient?

## 2018-03-08 NOTE — TELEPHONE ENCOUNTER
Pt's home health nurse said the pt has been taking B1 250 mg, but [facility] (which delivers to him) only has 100 mg tabs and not 50 mg tabs. He would prefer not to have to take 3 tabs to equal that dose, but will if  feels he needs it. He is asking if 100 mg is sufficient?

## 2018-03-10 DIAGNOSIS — Z12.11 SCREEN FOR COLON CANCER: Primary | ICD-10-CM

## 2018-03-11 PROBLEM — Z11.59 ENCOUNTER FOR HEPATITIS C SCREENING TEST FOR LOW RISK PATIENT: Status: RESOLVED | Noted: 2017-11-29 | Resolved: 2018-03-11

## 2018-03-11 PROBLEM — E86.0 DEHYDRATION: Status: RESOLVED | Noted: 2018-02-01 | Resolved: 2018-03-11

## 2018-03-12 ENCOUNTER — TELEPHONE (OUTPATIENT)
Dept: INTERNAL MEDICINE | Facility: CLINIC | Age: 63
End: 2018-03-12

## 2018-03-12 NOTE — TELEPHONE ENCOUNTER
----- Message from Camron Lovell MD sent at 3/10/2018  2:14 PM CST -----  pls ask home health to  [facility] for pt

## 2018-03-12 NOTE — TELEPHONE ENCOUNTER
Spoke with home health nurse and informed her that taking 100mg of B1 if fine. Nurse verbalized understanding

## 2018-04-06 ENCOUNTER — TELEPHONE (OUTPATIENT)
Dept: INTERNAL MEDICINE | Facility: CLINIC | Age: 63
End: 2018-04-06